# Patient Record
Sex: MALE | Race: WHITE | NOT HISPANIC OR LATINO | RURAL
[De-identification: names, ages, dates, MRNs, and addresses within clinical notes are randomized per-mention and may not be internally consistent; named-entity substitution may affect disease eponyms.]

---

## 2017-08-08 ENCOUNTER — OTHER- (OUTPATIENT)
Dept: RURAL CLINIC 1 | Facility: CLINIC | Age: 47
Setting detail: DERMATOLOGY
End: 2017-08-08

## 2017-08-08 DIAGNOSIS — D48.5 NEOPLASM OF UNCERTAIN BEHAVIOR OF SKIN: ICD-10-CM

## 2017-08-08 PROBLEM — L82.1 OTHER SEBORRHEIC KERATOSIS: Status: ACTIVE | Noted: 2017-08-08

## 2017-08-08 PROBLEM — L82.1 OTHER SEBORRHEIC KERATOSIS: Status: RESOLVED | Noted: 2017-08-08

## 2017-08-08 PROCEDURE — 99213 OFFICE O/P EST LOW 20 MIN: CPT

## 2017-08-08 RX ORDER — CETYL ALC/STEARYL ALC/PG/SLS
CREAM (GRAM) TOPICAL
Qty: 454 | Refills: 5 | Status: DISCONTINUED
Start: 2017-08-08 | End: 2017-08-11

## 2017-08-11 ENCOUNTER — RX ONLY (RX ONLY)
Age: 47
End: 2017-08-11

## 2017-08-11 RX ORDER — EMOLLIENT - LOTION
1 APPLICATION LOTION TOPICAL DAILY
Qty: 473 | Refills: 5 | Status: DISCONTINUED
Start: 2017-08-11 | End: 2019-08-27

## 2018-08-27 ENCOUNTER — SKIN CHECK (OUTPATIENT)
Dept: RURAL CLINIC 1 | Facility: CLINIC | Age: 48
Setting detail: DERMATOLOGY
End: 2018-08-27

## 2018-08-27 DIAGNOSIS — C44.319 BASAL CELL CARCINOMA OF SKIN OF OTHER PARTS OF FACE: ICD-10-CM

## 2018-08-27 PROCEDURE — 11302 SHAVE SKIN LESION 1.1-2.0 CM: CPT

## 2018-08-27 PROCEDURE — 99213 OFFICE O/P EST LOW 20 MIN: CPT

## 2019-08-27 ENCOUNTER — SKIN CHECK (OUTPATIENT)
Dept: RURAL CLINIC 1 | Facility: CLINIC | Age: 49
Setting detail: DERMATOLOGY
End: 2019-08-27

## 2019-08-27 DIAGNOSIS — D48.5 NEOPLASM OF UNCERTAIN BEHAVIOR OF SKIN: ICD-10-CM

## 2019-08-27 PROCEDURE — 99213 OFFICE O/P EST LOW 20 MIN: CPT

## 2020-09-15 ENCOUNTER — SKIN CHECK (OUTPATIENT)
Dept: RURAL CLINIC 1 | Facility: CLINIC | Age: 50
Setting detail: DERMATOLOGY
End: 2020-09-15

## 2020-09-15 DIAGNOSIS — L57.0 ACTINIC KERATOSIS: ICD-10-CM

## 2020-09-15 PROCEDURE — 99213 OFFICE O/P EST LOW 20 MIN: CPT

## 2021-10-04 ENCOUNTER — SKIN CHECK (OUTPATIENT)
Dept: RURAL CLINIC 2 | Facility: CLINIC | Age: 51
Setting detail: DERMATOLOGY
End: 2021-10-04

## 2021-10-04 DIAGNOSIS — L57.0 ACTINIC KERATOSIS: ICD-10-CM

## 2021-10-04 PROCEDURE — 99213 OFFICE O/P EST LOW 20 MIN: CPT

## 2022-10-10 ENCOUNTER — APPOINTMENT (OUTPATIENT)
Dept: RURAL CLINIC 9 | Age: 52
Setting detail: DERMATOLOGY
End: 2022-10-10

## 2022-10-10 DIAGNOSIS — D18.0 HEMANGIOMA: ICD-10-CM

## 2022-10-10 DIAGNOSIS — L81.4 OTHER MELANIN HYPERPIGMENTATION: ICD-10-CM

## 2022-10-10 DIAGNOSIS — Z87.2 PERSONAL HISTORY OF DISEASES OF THE SKIN AND SUBCUTANEOUS TISSUE: ICD-10-CM

## 2022-10-10 DIAGNOSIS — D22 MELANOCYTIC NEVI: ICD-10-CM

## 2022-10-10 DIAGNOSIS — L82.1 OTHER SEBORRHEIC KERATOSIS: ICD-10-CM

## 2022-10-10 PROBLEM — D22.5 MELANOCYTIC NEVI OF TRUNK: Status: ACTIVE | Noted: 2022-10-10

## 2022-10-10 PROBLEM — D18.01 HEMANGIOMA OF SKIN AND SUBCUTANEOUS TISSUE: Status: ACTIVE | Noted: 2022-10-10

## 2022-10-10 PROCEDURE — OTHER MIPS QUALITY: OTHER

## 2022-10-10 PROCEDURE — OTHER COUNSELING: OTHER

## 2022-10-10 PROCEDURE — 99213 OFFICE O/P EST LOW 20 MIN: CPT

## 2022-10-10 ASSESSMENT — LOCATION ZONE DERM
LOCATION ZONE: ARM
LOCATION ZONE: TRUNK
LOCATION ZONE: NECK

## 2022-10-10 ASSESSMENT — LOCATION DETAILED DESCRIPTION DERM
LOCATION DETAILED: LEFT LATERAL ABDOMEN
LOCATION DETAILED: LEFT CLAVICULAR SKIN
LOCATION DETAILED: LEFT POSTERIOR SHOULDER
LOCATION DETAILED: MID TRAPEZIAL NECK
LOCATION DETAILED: RIGHT SUPERIOR UPPER BACK

## 2022-10-10 ASSESSMENT — LOCATION SIMPLE DESCRIPTION DERM
LOCATION SIMPLE: LEFT CLAVICULAR SKIN
LOCATION SIMPLE: LEFT SHOULDER
LOCATION SIMPLE: TRAPEZIAL NECK
LOCATION SIMPLE: ABDOMEN
LOCATION SIMPLE: RIGHT UPPER BACK

## 2023-11-28 ENCOUNTER — APPOINTMENT (OUTPATIENT)
Dept: RURAL CLINIC 9 | Age: 53
Setting detail: DERMATOLOGY
End: 2023-11-28

## 2023-11-28 DIAGNOSIS — L81.2 FRECKLES: ICD-10-CM

## 2023-11-28 DIAGNOSIS — Z87.2 PERSONAL HISTORY OF DISEASES OF THE SKIN AND SUBCUTANEOUS TISSUE: ICD-10-CM

## 2023-11-28 DIAGNOSIS — D18.0 HEMANGIOMA: ICD-10-CM

## 2023-11-28 DIAGNOSIS — D22 MELANOCYTIC NEVI: ICD-10-CM

## 2023-11-28 DIAGNOSIS — Z71.89 OTHER SPECIFIED COUNSELING: ICD-10-CM

## 2023-11-28 DIAGNOSIS — L82.1 OTHER SEBORRHEIC KERATOSIS: ICD-10-CM

## 2023-11-28 PROBLEM — D22.5 MELANOCYTIC NEVI OF TRUNK: Status: ACTIVE | Noted: 2023-11-28

## 2023-11-28 PROBLEM — D18.01 HEMANGIOMA OF SKIN AND SUBCUTANEOUS TISSUE: Status: ACTIVE | Noted: 2023-11-28

## 2023-11-28 PROCEDURE — 99213 OFFICE O/P EST LOW 20 MIN: CPT

## 2023-11-28 PROCEDURE — OTHER COUNSELING: OTHER

## 2023-11-28 PROCEDURE — OTHER MIPS QUALITY: OTHER

## 2023-11-28 ASSESSMENT — LOCATION SIMPLE DESCRIPTION DERM
LOCATION SIMPLE: LEFT UPPER BACK
LOCATION SIMPLE: RIGHT UPPER BACK
LOCATION SIMPLE: ABDOMEN
LOCATION SIMPLE: CHEST

## 2023-11-28 ASSESSMENT — LOCATION DETAILED DESCRIPTION DERM
LOCATION DETAILED: LEFT MEDIAL INFERIOR CHEST
LOCATION DETAILED: RIGHT SUPERIOR LATERAL UPPER BACK
LOCATION DETAILED: RIGHT MID-UPPER BACK
LOCATION DETAILED: LEFT SUPERIOR LATERAL UPPER BACK
LOCATION DETAILED: LEFT MID-UPPER BACK
LOCATION DETAILED: PERIUMBILICAL SKIN

## 2023-11-28 ASSESSMENT — LOCATION ZONE DERM: LOCATION ZONE: TRUNK

## 2023-11-28 NOTE — HPI: SKIN CHECK
What Is The Reason For Today's Visit?: the risk of recurrence, and the development of new lesions
electronic